# Patient Record
Sex: MALE | Race: WHITE
[De-identification: names, ages, dates, MRNs, and addresses within clinical notes are randomized per-mention and may not be internally consistent; named-entity substitution may affect disease eponyms.]

---

## 2017-06-16 ENCOUNTER — HOSPITAL ENCOUNTER (EMERGENCY)
Dept: HOSPITAL 62 - ER | Age: 27
Discharge: HOME | End: 2017-06-16
Payer: SELF-PAY

## 2017-06-16 VITALS — DIASTOLIC BLOOD PRESSURE: 64 MMHG | SYSTOLIC BLOOD PRESSURE: 101 MMHG

## 2017-06-16 DIAGNOSIS — M54.5: Primary | ICD-10-CM

## 2017-06-16 DIAGNOSIS — F17.200: ICD-10-CM

## 2017-06-16 PROCEDURE — 99283 EMERGENCY DEPT VISIT LOW MDM: CPT

## 2017-06-16 NOTE — ER DOCUMENT REPORT
HPI





- HPI


Patient complains to provider of: low back pain


Onset: This afternoon


Onset/Duration: Sudden


Quality of pain: Sharp


Pain Level: 5


Context: 


Patient states that a lawnmower fell in a ditch and he was attempting to lift 

it out.  Patient states that he started to ignore up he felt a pop in his lower 

back and had sudden pain.  Patient reports pain all across his lower back.  

Patient denies any radiculopathy or paresthesia.  Patient denies any previous 

history of low back pain.  Patient denies any urinary retention or incontinence.


Associated Symptoms: Other - Low back pain


Exacerbated by: Movement, Walking


Relieved by: Denies


Similar symptoms previously: No


Recently seen / treated by doctor: No





- ROS


ROS below otherwise negative: Yes


Systems Reviewed and Negative: Yes All other systems reviewed and negative





- CONSTITUTIONAL


Constitutional: DENIES: Fever





- NEURO


Neurology: DENIES: Weakness





- GASTROINTESTINAL


Gastrointestinal: DENIES: Nausea





- MUSCULOSKELETAL


Musculoskeletal: REPORTS: Back Pain.  DENIES: Extremity pain, Neck Pain





- DERM


Skin Color: Normal


Skin Problems: None





Past Medical History





- General


Information source: Patient





- Social History


Smoking Status: Current Every Day Smoker


Frequency of alcohol use: None


Drug Abuse: None


Occupation: 


Lives with: Family


Family History: None


Patient has suicidal ideation: No


Patient has homicidal ideation: No





- Medical History


Medical History: Negative


Neurological Medical History: Reports: Hx Migraine


Renal/ Medical History: Denies: Hx Peritoneal Dialysis


Past Surgical History: Reports: Hx Appendectomy





- Immunizations


Immunizations up to date: Yes


Hx Diphtheria, Pertussis, Tetanus Vaccination: Yes





Vertical Provider Document





- CONSTITUTIONAL


Agree With Documented VS: Yes


Exam Limitations: No Limitations


General Appearance: WD/WN, No Apparent Distress


Notes: 


PHYSICAL EXAMINATION:





GENERAL: Well-appearing, well-nourished and in no acute distress.





HEAD: Atraumatic, normocephalic.





EYES: sclera clear, anicteric, conjunctiva are normal.





ENT: nares patent, Moist mucous membranes.





NECK: Normal range of motion, supple no lymphadenopathy





LUNGS: respirations unlabored





HEART: Regular rate and rhythm without murmurs 





EXTREMITIES: Normal range of motion, no pitting or edema.  No cyanosis. Gait 

normal, pt ambulates without difficulty





BACK: lumbar paraspinal tenderness, no deformities or step-offs.  No CVA 

tenderness. 





NEUROLOGICAL: Cranial nerves grossly intact.  Normal speech, normal gait.  No 

saddle anesthesia. negative SLT bilat





PSYCH: Normal mood, normal affect.





SKIN: Warm, Dry, normal turgor, no rashes or lesions noted.











- INFECTION CONTROL


TRAVEL OUTSIDE OF THE [place] IN LAST 30 DAYS: No





- RESPIRATORY


O2 Sat by Pulse Oximetry: 97





Course





- Vital Signs


Vital signs: 


 











Temp Pulse Resp BP Pulse Ox


 


 98 F   81   18   109/58 L  97 


 


 06/16/17 16:23  06/16/17 16:23  06/16/17 16:23  06/16/17 16:23  06/16/17 16:23














Discharge





- Discharge


Clinical Impression: 


Low back pain


Qualifiers:


 Chronicity: acute Back pain laterality: bilateral Sciatica presence: without 

sciatica Qualified Code(s): M54.5 - Low back pain





Condition: Stable


Disposition: HOME, SELF-CARE


Instructions:  Low Back Pain ([facility]), Ice Packs ([facility]), Warm Packs ([facility]), Oral 

Narcotic Medication ([facility])


Additional Instructions: 


Return immediately for any new or worsening symptoms





Followup with your primary care provider, call tomorrow to make a followup 

appointment








Prescriptions: 


Cyclobenzaprine HCl [Flexeril 10 Mg Tablet] 10 mg PO TID #15 tablet


Oxycodone HCl/Acetaminophen [Percocet 5-325 mg Tablet] 1 - 2 tab PO ASDIR PRN #

15 tablet


 PRN Reason: 


Forms:  Return to Work


Referrals: 


[facility] [Provider Group] - Follow up as needed

## 2017-06-26 ENCOUNTER — HOSPITAL ENCOUNTER (EMERGENCY)
Dept: HOSPITAL 62 - ER | Age: 27
Discharge: LEFT BEFORE DISCHARGE | End: 2017-06-26
Payer: SELF-PAY

## 2017-06-26 DIAGNOSIS — Z53.21: Primary | ICD-10-CM

## 2017-07-02 ENCOUNTER — HOSPITAL ENCOUNTER (EMERGENCY)
Dept: HOSPITAL 62 - ER | Age: 27
LOS: 1 days | Discharge: LEFT BEFORE DISCHARGE | End: 2017-07-03
Payer: SELF-PAY

## 2017-07-02 DIAGNOSIS — Z53.21: Primary | ICD-10-CM

## 2017-07-03 VITALS — SYSTOLIC BLOOD PRESSURE: 132 MMHG | DIASTOLIC BLOOD PRESSURE: 84 MMHG

## 2017-07-14 ENCOUNTER — HOSPITAL ENCOUNTER (EMERGENCY)
Dept: HOSPITAL 62 - ER | Age: 27
Discharge: HOME | End: 2017-07-14
Payer: SELF-PAY

## 2017-07-14 VITALS — DIASTOLIC BLOOD PRESSURE: 74 MMHG | SYSTOLIC BLOOD PRESSURE: 126 MMHG

## 2017-07-14 DIAGNOSIS — M54.5: Primary | ICD-10-CM
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
## 2017-07-14 DIAGNOSIS — F17.210: ICD-10-CM

## 2017-07-14 PROCEDURE — 96372 THER/PROPH/DIAG INJ SC/IM: CPT

## 2017-07-14 PROCEDURE — 99283 EMERGENCY DEPT VISIT LOW MDM: CPT

## 2017-07-14 NOTE — ER DOCUMENT REPORT
ED Neck/Back Problem





- General


Chief Complaint: Back Injury


Stated Complaint: FALL/BACK PAIN


Time Seen by Provider: 07/14/17 11:29


Mode of Arrival: Ambulatory


Information source: Patient


Notes: 


26-year-old male presents to ED for low back pain.  He states he was moving a 

washer up the stairs and his leg gave out and fell and hit the back on the 

stairs.  Back felt like it popped when he was trying to lift.  Having a lot of 

pain in his back.  He has had frequent pain since 2013.  Has been seen here 

multiple times for back pain.


TRAVEL OUTSIDE OF THE [place] IN LAST 30 DAYS: No





- HPI


Patient complains to provider of: Lower back


Onset: Yesterday


Where: Home, Indoors


Onset: Gradual


Timing: Still present


Quality of pain: Sharp


Severity: Moderate


Pain Level: 4


Context: Lifting


Recent injury: Possibly


Associated symptoms: Like prior neck/back pain, Lower back pain.  denies: Motor 

loss, Numbness/tingling, Radiation to leg, Sensory loss, Unable to urinate


Relieved by: Nothing


Similar symptoms previously: Yes


Recently seen / treated by doctor: Yes





- Related Data


Allergies/Adverse Reactions: 


 





No Known Allergies Allergy (Verified 06/16/17 16:23)


 











Past Medical History





- General


Information source: Patient





- Social History


Smoking Status: Current Every Day Smoker


Cigarette use (# per day): Yes - Pack per day


Chew tobacco use (# tins/day): No


Smoking Education Provided: Yes - Less than 2 minutes


Frequency of alcohol use: Occasional


Drug Abuse: None


Occupation: Labor lawn maintenance


Lives with: Family


Family History: CAD, CVA, DM, Hyperlipidemia, Hypertension, Malignancy.  denies

: COPD, Thyroid Disfunction


Patient has suicidal ideation: No


Patient has homicidal ideation: No





- Past Medical History


Cardiac Medical History: Reports: None


Pulmonary Medical History: Reports: None


EENT Medical History: Reports: None


Neurological Medical History: Reports: Hx Migraine


Endocrine Medical History: Reports: None


Renal/ Medical History: Reports: None


Malignancy Medical History: Reports None


GI Medical History: Reports: None


Musculoskeltal Medical History: Reports Hx Arthritis, Reports Hx 

Musculoskeletal Deformity, Reports Hx Musculoskeletal Trauma


Skin Medical History: Reports None


Psychiatric Medical History: Reports: None


Traumatic Medical History: Reports: None


Infectious Medical History: Reports: None


Past Surgical History: Reports: Hx Appendectomy





- Immunizations


Immunizations up to date: Yes


Hx Diphtheria, Pertussis, Tetanus Vaccination: Yes





Review of Systems





- Review of Systems


Constitutional: No symptoms reported


EENT: No symptoms reported


Cardiovascular: No symptoms reported


Respiratory: No symptoms reported


Gastrointestinal: No symptoms reported.  denies: Abdominal pain, Diarrhea, 

Nausea, Constipation, Last bowel movement, Fecal incontinence


Genitourinary: No symptoms reported.  denies: Incontinence, Retention


Male Genitourinary: No symptoms reported


Musculoskeletal: Back pain, Muscle pain


Skin: No symptoms reported


Hematologic/Lymphatic: No symptoms reported


Neurological/Psychological: No symptoms reported


-: Yes All other systems reviewed and negative





Physical Exam





- Vital signs


Vitals: 


 











Temp Pulse Resp BP Pulse Ox


 


 98.1 F   86   16   126/74 H  96 


 


 07/14/17 11:19  07/14/17 11:19  07/14/17 11:19  07/14/17 11:19  07/14/17 11:19











Interpretation: Normal





- General


General appearance: Appears well, Alert





- HEENT


Head: Normocephalic, Atraumatic


Eyes: Normal


Pupils: PERRL





- Respiratory


Respiratory status: No respiratory distress


Chest status: Nontender


Breath sounds: Normal


Chest palpation: Normal





- Cardiovascular


Rhythm: Regular


Heart sounds: Normal auscultation


Murmur: No





- Abdominal


Inspection: Normal


Distension: No distension


Bowel sounds: Normal


Tenderness: Nontender


Organomegaly: No organomegaly





- Back


Back: Normal, Tender.  No: Deformity/step-off, CVA tenderness, Vertebra 

tenderness, Scars, Scoliosis, Wounds





- Extremities


General upper extremity: Normal inspection, Nontender, Normal color, Normal ROM

, Normal temperature


General lower extremity: Normal inspection, Nontender, Normal color, Normal ROM

, Normal temperature, Normal weight bearing.  No: Ashleigh's sign





- Neurological


Neuro grossly intact: Yes


Cognition: Normal


Orientation: AAOx4


[place] Coma Scale Eye Opening: Spontaneous


[place] Coma Scale Verbal: Oriented


[place] Coma Scale Motor: Obeys Commands


[place]ack Coma Scale Total: 15


Speech: Normal


Motor strength normal: LUE, RUE, LLE, RLE


Sensory: Normal





- Psychological


Associated symptoms: Normal affect, Normal mood





- Skin


Skin Temperature: Warm


Skin Moisture: Dry


Skin Color: Normal





Course





- Re-evaluation


Re-evalutation: 





07/14/17 12:09


Signs or symptoms of cauda equina, no loss of sensation, no loss of bowel or 

bladder control, no loss of muscle control, and no saddle anesthesia.  Has had 

chronic back pain in this area since 2011.  Today his pain was activated by 

lifting a washing machine and his feet come out from under him.  Treated with 

Toradol and Flexeril and discharged home with prescription for Flexeril.





- Vital Signs


Vital signs: 


 











Temp Pulse Resp BP Pulse Ox


 


 98.1 F   86   16   126/74 H  96 


 


 07/14/17 11:19  07/14/17 11:19  07/14/17 11:19  07/14/17 11:19  07/14/17 11:19














Discharge





- Discharge


Clinical Impression: 


Low back pain


Qualifiers:


 Chronicity: chronic Back pain laterality: bilateral Sciatica presence: without 

sciatica Qualified Code(s): M54.5 - Low back pain





Condition: Stable


Disposition: HOME, SELF-CARE


Instructions:  Family Physicians / Practices, Stretching Exercises for the Back 

([facility])


Additional Instructions: 


LOW BACK PAIN:





     Three out of every four people will have an episode of disabling back pain 

during their lifetime.  Most commonly the pain is due to straining of the 

muscles and ligaments in the low back.


     Usual treatment includes: 


(1) Rest on a firm surface.  Avoid lying on your stomach.  


(2) Ice pack the painful area.  After a few days, gentle heat may be used 

intermittently to relax the area, or ice packs can be continued.  


(3) Medication may be needed -- muscle relaxers and antiinflammatory medicines 

are commonly used.  


(4) As the back improves, exercises are prescribed to strengthen the back and 

abdominal muscles.


     Your doctor will advise you on the proper care for your back at each stage 

in your recovery.  You may be better in a few days -- or healing may take 

several weeks.


     If new symptoms of a "herniated disc" (radiation of pain, numbness, or 

tingling down the back of the leg or weakness in the leg) occur, you should be 

re-examined.  Further testing may be necessary.





Chronic Back Pain





     Chronic back pain (pain persisting longer than three months) is a common 

problem. A medical evaluation can look for herniated disc, arthritis, 

osteoporosis, tumors, and infections. But at least half the time, there's no 

obvious treatable cause. Anxiety and depression tend to worsen back pain.


     Ibuprofen or other anti-inflammatory medicine can help. A heating pad, 

used for 15-20 minutes at a time, can ease pain. For this type of back pain, 

narcotic medicines should be avoided. Muscle relaxers are rarely helpful unless 

you're having spasms.


     Activity is important. Find an aerobic exercise program that your back can 

tolerate. Too much rest makes back pain worse. Specific back exercises are 

usually prescribed to strengthen the back and abdominal muscles. Often, a 

physical therapist can help. Avoid heavy lifting, working while bent over, or 

standing with both knees straight.


     Most back pain patients do better with a firm mattress.


     If new symptoms of a "herniated disc" (radiation of pain, numbness, or 

tingling down the back of the leg or weakness in the leg) occur, you should be 

re-examined.





Chronic Pain Control





     Stress, inactivity, and depression make pain more severe regardless of the 

cause of the pain.  Stress and poor physical condition can cause pain such as 

headaches and backache.


     Relaxation:  Rest in a quiet place with your eyes closed for 20 minutes 

twice daily.  Concentrate on a pleasant image, or simply "feel" your breathing.

  Clear your mind.


     Stress management:  Deal with your "stressors."  Either take action, or 

eliminate the stressor from your life.  Don't let things hang over you.  Accept 

those things you can't change.


     Nutrition:  Eat small, balanced meals -- don't skip, don't overeat.  Meals 

should be high-carbohydrate, low-sugar, low-fat.


     Exercise:  Exercise helps painful conditions and eases stress. Get 30 

minutes of moderate exercise, five days a week. Do an activity that does not 

flare your pain.


     Precautions:  Pain which continues to disrupt daily activities, or which 

changes in nature, requires a medical evaluation.  Pain Clinic referral is 

available.





     


We do not manage chronic pain in the Emergency Department.  We will try to 

appropriately help you through an acute flare of your chronic painful condition

, but for on-going chronic pain that does not improve, you will need to see 

your private doctor or pain management specialist.  We do not provide repeated 

medication management of chronic painful conditions.  If you wish, we can 

provide the name of local pain management physicians.





MUSCLE RELAXERS: 


     Muscle relaxing medications are usually prescribed for acute muscle spasm 

or injury to the neck and back.  They are often combined with antiinflammatory 

pain medication for increased relief.


     You may stop the muscle relaxer when the pain and stiffness have improved.

  Start the medication again if spasms recur.  


     Muscle relaxers may cause drowsiness, especially with the first dose.  Do 

not operate machinery or drive while under the effects of the medication.  Most 

muscle relaxers last up to 24 hours.  Do not combine the medication with 

alcohol.








ICE PACKS:


     Apply ice packs frequently against the painful area.  Many different 

schedules are recommended, such as "20 minutes on, 20 minutes off" or "one hour 

ice, two hours rest."  If you need to work, you may need to go longer between 

ice treatments.  You should plan to have the area ice packed AT LEAST one 

fourth of the time.


     The ice should be applied over the wrap, tape, or splint, or over a layer 

of cloth -- not directly against the skin.  Some ice bags have a built-in cloth 

and can be put directly on the skin.





WARM PACKS:


     After approximately two days, apply gentle heat (such as a heating pad or 

hot water bottle) for about 20 to 30 minutes about every two hours -- at least 

four times daily.  Warmth and elevation will help you make a more rapid recovery

, and will ease the pain considerably.


     Do not use HOT heat, and never apply heat for longer than 30 minutes.  The 

continuous heat can invisibly damage skin and muscles -- even when no burn is 

seen on the surface.  Damaged muscles can make you MORE sore.








FOLLOW-UP CARE:


If you have been referred to a physician for follow-up care, call the physician

s office for an appointment as you were instructed or within the next two days.

  If you experience worsening or a significant change in your symptoms, notify 

the physician immediately or return to the Emergency Department at any time for 

re-evaluation.


Prescriptions: 


Cyclobenzaprine HCl [Flexeril 10 mg Tablet] 10 mg PO TIDP PRN #15 tab


 PRN Reason: 


Forms:  Elevated Blood Pressure, Smoking Cessation Education, Return to Work


Referrals: 


MARIO BORREGO MD [ACTIVE STAFF] - Follow up as needed

## 2019-04-05 ENCOUNTER — HOSPITAL ENCOUNTER (EMERGENCY)
Dept: HOSPITAL 62 - ER | Age: 29
Discharge: HOME | End: 2019-04-05
Payer: SELF-PAY

## 2019-04-05 VITALS — SYSTOLIC BLOOD PRESSURE: 142 MMHG | DIASTOLIC BLOOD PRESSURE: 89 MMHG

## 2019-04-05 DIAGNOSIS — S53.401A: Primary | ICD-10-CM

## 2019-04-05 DIAGNOSIS — G89.29: ICD-10-CM

## 2019-04-05 DIAGNOSIS — R11.2: ICD-10-CM

## 2019-04-05 DIAGNOSIS — F17.200: ICD-10-CM

## 2019-04-05 DIAGNOSIS — M25.521: ICD-10-CM

## 2019-04-05 DIAGNOSIS — M25.562: ICD-10-CM
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
## 2019-04-05 DIAGNOSIS — M25.462: ICD-10-CM
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
## 2019-04-05 PROCEDURE — 73564 X-RAY EXAM KNEE 4 OR MORE: CPT

## 2019-04-05 PROCEDURE — S0119 ONDANSETRON 4 MG: HCPCS

## 2019-04-05 PROCEDURE — 73080 X-RAY EXAM OF ELBOW: CPT

## 2019-04-05 PROCEDURE — 99283 EMERGENCY DEPT VISIT LOW MDM: CPT

## 2019-04-05 NOTE — RADIOLOGY REPORT (SQ)
EXAM DESCRIPTION:  ELBOW RIGHT OVER 2 VIEWS



COMPLETED DATE/TIME:  4/5/2019 9:43 am



REASON FOR STUDY:  struck elbow on wall



COMPARISON:  None.



NUMBER OF VIEWS:  Four views.



TECHNIQUE:  AP, lateral, and both oblique radiographic images acquired of the right elbow.



LIMITATIONS:  None.



FINDINGS:  MINERALIZATION: Normal.

BONES: No acute fracture or dislocation.  No worrisome bone lesions.

JOINT: No effusion.

SOFT TISSUES: No soft tissue swelling.  No foreign body.

OTHER: No other significant finding.



IMPRESSION:  No fracture or dislocation of the right elbow.  Joint spaces are well preserved.  No elb
ow joint effusion to suggest radiographically occult fracture.



TECHNICAL DOCUMENTATION:  JOB ID:  [id number]

 2011 [employer]- All Rights Reserved



Reading location - IP/workstation name: [email or web address]

## 2019-04-05 NOTE — ER DOCUMENT REPORT
HPI





- HPI


Patient complains to provider of: Right elbow, left knee pain


Time Seen by Provider: 04/05/19 09:19


Onset/Duration: Persistent


Quality of pain: Achy


Pain Level: 3


Context: 





Patient states that he has had chronic knee pain off and on since he was a 

child.  Patient states typically the pain only lasted for a few days and then 

resolves.  Patient presently complains of left knee pain for the past 3 weeks 

that has been persistent.  Patient denies any new knee injury.  Patient states 

that he was moving furniture yesterday and accidentally struck his right elbow 

against a wall.  Patient complains of right elbow pain with movement.  Patient 

states he woke up today and the pain made him feel nauseous and he vomited 

twice.  Patient without any abdominal tenderness or fever.


Associated Symptoms: Nausea, Vomiting, Other - Right elbow, left knee pain.  

denies: Nonproductive cough, Fever


Exacerbated by: Movement


Relieved by: Remaining still


Similar symptoms previously: Yes - Left knee pain


Recently seen / treated by doctor: No





- ROS


ROS below otherwise negative: Yes


Systems Reviewed and Negative: Yes All other systems reviewed and negative





- CONSTITUTIONAL


Constitutional: DENIES: Fever, Chills





- NEURO


Neurology: DENIES: Weakness





- GASTROINTESTINAL


Gastrointestinal: REPORTS: Nausea, Patient vomiting.  DENIES: Abdominal Pain, 

Diarrhea





- URINARY


Urinary: DENIES: Dysuria





- MUSCULOSKELETAL


Musculoskeletal: REPORTS: Extremity pain - Right elbow, left knee, Swelling - 

Left knee





- DERM


Skin Color: Normal


Skin Problems: None





Past Medical History





- General


Information source: Patient





- Social History


Smoking Status: Current Every Day Smoker


Smoking Education Provided: Yes


Frequency of alcohol use: Occasional


Drug Abuse: None


Occupation: lawn service


Family History: CAD, CVA, DM, Hyperlipidemia, Hypertension, Malignancy.  denies:

COPD, Thyroid Disfunction


Neurological Medical History: Reports: Hx Migraine


Renal/ Medical History: Denies: Hx Peritoneal Dialysis


Musculoskeletal Medical History: Reports Hx Arthritis, Reports Hx 

Musculoskeletal Deformity, Reports Hx Musculoskeletal Trauma


Past Surgical History: Reports: Hx Appendectomy





- Immunizations


Immunizations up to date: Yes


Hx Diphtheria, Pertussis, Tetanus Vaccination: Yes





Vertical Provider Document





- CONSTITUTIONAL


Agree With Documented VS: Yes


Exam Limitations: No Limitations


General Appearance: WD/WN, No Apparent Distress





- INFECTION CONTROL


TRAVEL OUTSIDE OF THE [place] IN LAST 30 DAYS: No





- HEENT


HEENT: Atraumatic, Normocephalic





- NECK


Neck: Normal Inspection





- RESPIRATORY


Respiratory: Breath Sounds Normal, No Respiratory Distress





- CARDIOVASCULAR


Cardiovascular: Regular Rate, Regular Rhythm


Pulses: Normal: Radial, Posterior tibial





- MUSCULOSKELETAL/EXTREMETIES


Musculoskeletal/Extremeties: MAEW, FROM, Tender - Tenderness over olecranon 

process of right elbow.  No objective swelling.  Normal skin color and 

temperature overlying right elbow.  Patient with full range of motion.  No def

ormity.  Left knee joint tenderness to inferior compartment with mild joint 

effusion.  Normal skin color and temperature overlying joint.  Patient with full

range of motion.  Patellar tendon intact.  No laxity with varus or valgus 

maneuvers., Edema - Left knee





- NEURO


Level of Consciousness: Awake, Alert, Appropriate


Motor/Sensory: No Motor Deficit





- DERM


Integumentary: Warm, Dry, No Rash





Course





- Re-evaluation


Re-evalutation: 





04/05/19 10:16


X-rays reviewed, no concern for acute fracture or dislocation.  No concern for 

septic arthritis or gout at this time.  Offered patient crutches, patient 

declined.  Patient encouraged to follow-up with orthopedics for any persistent 

pain or problems.


04/05/19 10:26


Patient preferred to apply Ace wrap to knee once he was home.  Patient 

instructed on correct use per PCT.





- Vital Signs


Vital signs: 


                                        











Temp Pulse Resp BP Pulse Ox


 


 97.7 F   92   18   142/89 H  96 


 


 04/05/19 09:13  04/05/19 09:13  04/05/19 09:13  04/05/19 09:13  04/05/19 09:13














- Diagnostic Test


Radiology reviewed: Image reviewed, Reports reviewed





Procedures





- Immobilization


  ** Right Elbow


Pre-Proc Neuro Vasc Exam: Normal


Immobilizer type: Sling


Performed by: PCT


Post-Proc Neuro Vasc Exam: Normal


Alignment checked and good: Yes





Discharge





- Discharge


Clinical Impression: 


Sprain of right elbow


Qualifiers:


 Encounter type: initial encounter Qualified Code(s): S53.401A - Unspecified 

sprain of right elbow, initial encounter





Chronic knee pain


Qualifiers:


 Laterality: left Qualified Code(s): M25.562 - Pain in left knee





Condition: Stable


Disposition: HOME, SELF-CARE


Instructions:  Arthritis ([facility]), Ice & Elevation ([facility]), Suspected Internal Knee 

Injury ([facility]), Sprain ([facility]), Temporary Sling ([facility])


Additional Instructions: 


Return immediately for any new or worsening symptoms





Followup with your primary care provider, call tomorrow to make a followup ap

pointment





Wear sling for 4-5 days and then remove.  If still having pain follow-up with 

orthopedics for further evaluation.  Only wear sling while awake.


Prescriptions: 


Naproxen [Naprosyn 250 Nmg Tablet] 1 tab PO BID #14 tablet


Ondansetron HCl [Zofran 4 mg Tablet] 1 - 2 tab PO Q6 PRN #15 tablet


 PRN Reason: 


Forms:  Smoking Cessation Education, Return to Work


Referrals: 


[facility] ([facility] [Provider Group] - Follow up as needed

## 2019-04-05 NOTE — RADIOLOGY REPORT (SQ)
EXAM DESCRIPTION:  KNEE LEFT 4 VIEW



COMPLETED DATE/TIME:  4/5/2019 9:43 am



REASON FOR STUDY:  L knee pain



COMPARISON:  None.



NUMBER OF VIEWS:  Four views.



TECHNIQUE:  AP, lateral, and both oblique radiographic images acquired of the left knee.



LIMITATIONS:  None.



FINDINGS:  MINERALIZATION: Normal.

BONES: No acute fracture or dislocation.  No worrisome bone lesions.

JOINT: Moderate knee joint effusion.  Joint spaces are well preserved.

SOFT TISSUES: No soft tissue swelling.  No radio-opaque foreign body.

OTHER: No other significant finding.



IMPRESSION:  No fracture or dislocation of the left knee.  There is a moderate nonspecific knee joint
 effusion.  Consider MRI to further evaluate for internal derangement.



TECHNICAL DOCUMENTATION:  JOB ID:  [id number]

 2011 [employer]- All Rights Reserved



Reading location - IP/workstation name: [email or web address]

## 2019-04-09 ENCOUNTER — HOSPITAL ENCOUNTER (EMERGENCY)
Dept: HOSPITAL 62 - ER | Age: 29
Discharge: HOME | End: 2019-04-09
Payer: SELF-PAY

## 2019-04-09 VITALS — DIASTOLIC BLOOD PRESSURE: 89 MMHG | SYSTOLIC BLOOD PRESSURE: 117 MMHG
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
## 2019-04-09 DIAGNOSIS — S53.401A: Primary | ICD-10-CM

## 2019-04-09 DIAGNOSIS — F17.210: ICD-10-CM

## 2019-04-09 PROCEDURE — 99283 EMERGENCY DEPT VISIT LOW MDM: CPT

## 2019-04-09 NOTE — ER DOCUMENT REPORT
ED Extremity Problem, Upper





- General


Chief Complaint: Elbow Injury


Stated Complaint: ELBOW PAIN


Time Seen by Provider: 04/09/19 14:25


Primary Care Provider: 


[facility] [Provider Group] - Follow up as needed


Mode of Arrival: Ambulatory


Information source: Patient


Notes: 





28-year-old male presents to ED for complaint of right elbow pain since he was 

seen last week and diagnosed with a sprain elbow.  He states he stayed out of 

work and has been taking the naproxen as instructed.  He states he went back to 

work today and the pain was so bad he had to go home.  He states he is not able 

to lift his shoulder all the way up or to bend his elbow back and forth as 

instructed.  He states he did not follow-up with the orthopedics as instructed.


TRAVEL OUTSIDE OF THE [place] IN LAST 30 DAYS: No





- HPI


Patient complains to provider of: Injury, Pain, Right, Elbow


Recent injury: Yes - 4/5/2019 diagnosed with sprained both


Where: Work


Quality of pain: Pressure, Sharp


Severity of pain: Moderate


Pain Level: 4


Context: Blow


Associated symptoms: None


Exacerbated by: Movement, Exertion


Relieved by: Rest, Positioning


Similar symptoms previously: Yes


Recently seen / treated by doctor: Yes





- Related Data


Allergies/Adverse Reactions: 


                                        





No Known Allergies Allergy (Verified 04/09/19 13:47)


   











Past Medical History





- General


Information source: Patient





- Social History


Smoking Status: Current Every Day Smoker


Cigarette use (# per day): Yes


Smoking Education Provided: Yes - Minutes


Frequency of alcohol use: Occasional


Drug Abuse: None


Occupation: Lawn service


Family History: CAD, CVA, DM, Hyperlipidemia, Hypertension, Malignancy.  denies:

COPD, Thyroid Disfunction


Patient has suicidal ideation: No


Patient has homicidal ideation: No





- Past Medical History


Cardiac Medical History: Reports: None


Pulmonary Medical History: Reports: None


EENT Medical History: Reports: None


Neurological Medical History: Reports: Hx Migraine


Endocrine Medical History: Reports: None


Renal/ Medical History: Reports: None


Malignancy Medical History: Reports None


GI Medical History: Reports: None


Musculoskeletal Medical History: Reports Hx Arthritis, Reports Hx 

Musculoskeletal Deformity, Reports Hx Musculoskeletal Trauma


Skin Medical History: Reports None


Psychiatric Medical History: Reports: None


Traumatic Medical History: Reports: None


Infectious Medical History: Reports: None


Past Surgical History: Reports: Hx Appendectomy





- Immunizations


Immunizations up to date: Yes


Hx Diphtheria, Pertussis, Tetanus Vaccination: Yes





Review of Systems





- Review of Systems


Constitutional: No symptoms reported


EENT: No symptoms reported


Cardiovascular: No symptoms reported


Respiratory: No symptoms reported


Gastrointestinal: No symptoms reported


Genitourinary: No symptoms reported


Male Genitourinary: No symptoms reported


Musculoskeletal: Joint pain - Left elbow


Skin: No symptoms reported


Hematologic/Lymphatic: No symptoms reported


Neurological/Psychological: No symptoms reported


-: Yes All other systems reviewed and negative





Physical Exam





- Vital signs


Vitals: 


                                        











Temp Pulse Resp BP Pulse Ox


 


 97.5 F   76   16   117/89 H  99 


 


 04/09/19 13:59  04/09/19 13:59  04/09/19 13:59  04/09/19 13:59  04/09/19 13:59











Interpretation: Normal





- General


General appearance: Appears well, Alert





- HEENT


Head: Normocephalic, Atraumatic


Eyes: Normal


Pupils: PERRL





- Respiratory


Respiratory status: No respiratory distress


Chest status: Nontender


Breath sounds: Normal


Chest palpation: Normal





- Cardiovascular


Rhythm: Regular


Heart sounds: Normal auscultation


Murmur: No





- Abdominal


Inspection: Normal


Distension: No distension


Bowel sounds: Normal


Tenderness: Nontender


Organomegaly: No organomegaly





- Back


Back: Normal, Nontender





- Extremities


General upper extremity: Normal inspection, Normal color, Normal temperature


General lower extremity: Normal inspection, Nontender, Normal color, Normal ROM,

Normal temperature, Normal weight bearing.  No: Ashleigh's sign


Elbow: Tender, Limited ROM - Patient states he cannot fully extend or flex the 

elbow due to pain.  No: Abrasion, Deformity, Dislocation, Ecchymosis, 

Instability, Joint effusion, Laceration, Swollen bursa





- Neurological


Neuro grossly intact: Yes


Cognition: Normal


Orientation: AAOx4


[place] Coma Scale Eye Opening: Spontaneous


[place] Coma Scale Verbal: Oriented


[place] Coma Scale Motor: Obeys Commands


[place] Coma Scale Total: 15


Speech: Normal


Motor strength normal: LUE, RUE, LLE, RLE


Sensory: Normal





- Psychological


Associated symptoms: Normal affect, Normal mood





- Skin


Skin Temperature: Warm


Skin Moisture: Dry


Skin Color: Normal





Course





- Re-evaluation


Re-evalutation: 





04/09/19 21:36


Patient was given instructions on exercises for the elbow as well as elevation 

ice ibuprofen.  Patient was given 1 narcotic in the emergency room and 

instructed to follow-up with orthopedics as he was previously instructed.  

Patient was discharged home after he verbalized understanding and agreement with

treatment plan.  His wife did come to pick him up.





- Vital Signs


Vital signs: 


                                        











Temp Pulse Resp BP Pulse Ox


 


 97.5 F   76   16   117/89 H  99 


 


 04/09/19 13:59  04/09/19 13:59  04/09/19 13:59  04/09/19 13:59  04/09/19 13:59














Discharge





- Discharge


Clinical Impression: 


Sprain of right elbow


Qualifiers:


 Encounter type: subsequent encounter Qualified Code(s): S53.401D - Unspecified 

sprain of right elbow, subsequent encounter





Condition: Stable


Disposition: HOME, SELF-CARE


Instructions:  Forearm Exercise Program ([facility]), Range of Motion Exercises ([facility])


Additional Instructions: 


SPRAIN:





     Your injury is a sprain.  A sprain results from stretching or tearing of 

the ligaments, usually from a twisting injury.  The ligaments will require time 

and protection in order to heal properly. Many sprains are quite disabling and 

should be taken seriously.


     The usual initial treatment of sprains is cold packs, elevation, and rest 

of the injured area.  Your physician has assessed the seriousness of your 

ligament injury, and has outlined a treatment plan. Understand that this 

treatment may change, depending on how you progress.


     If a re-examination was recommended, it is important that you follow up as 

instructed.  Call the doctor any time if there is severe pain, numbness, or loss

of function in the injured area.





ICE & ELEVATION:


     Apply ice packs frequently against the painful area.  Many different 

schedules are recommended, such as "20 minutes on, 20 minutes off" or "one hour 

ice, two hours rest."  If you need to work, you may need to go longer between 

ice treatments.  You should plan to have the area ice packed AT LEAST one-fourth

of the time.


     The ice should be applied over the wrap, tape, or splint, or over a layer 

of cloth -- not directly against the skin.  Some ice bags have a built-in cloth 

and can be put directly on the skin.


     Your injured part should be elevated as much as possible over the next 48 

hours.  Try to keep the injury above the level of the heart. Avoid use of the 

injured area.  Elevation and rest will decrease the swelling.








USE OF OVER-THE-COUNTER IBUPROFEN:


     Ibuprofen (Advil, Nuprin, Medipren, Motrin IB) is a medication for fever 

and pain control.  In addition, it has anti- inflammatory effects which may be 

beneficial, especially in the treatment of injuries.


     It's best to take ibuprofen with food.  Persons with ulcer disease or 

allergy to aspirin should notify their physician of this before taking 

ibuprofen.


     Ibuprofen can be given every four to six hours, for a total of four doses 

daily.


     Age              Pain or fever dose          Antiinflammatory dose


     6-8 yr              200 mg (1 tab)                200 mg (1 tab)


     9-11 yr             200 mg (1 tab)                200-400 mg (1-2 tab)


     11-14 yr            200-400 mg (1-2 tab)         400 mg (2 tab)


     15-adult            400 mg (2 tab)                600 mg (3 tab)








ORAL NARCOTIC MEDICATION:


     You have been given a norco for pain control.  This medication is a 

narcotic.  It's best taken with food, as nausea can result if taken on an empty 

stomach.


     Don't operate machinery or drive within six hours of taking this 

medication.  Do not combine this medicine with alcohol, or with any medication 

which can cause sedation (such as cold tablets or sleeping pills) unless you get

permission from the physician.


     Narcotics tend to cause constipation.  If possible, drink plenty of fluids 

and eat a diet high in fiber and fruits.





     Please be aware that prescription narcotics also have the potential for 

abuse.  People become addicted to these medications because of the general sense

of wellbeing that they induce.  This feeling along with a significant reduction 

in tension, anxiety, and aggression provides a stimulating seductive quality to 

these drugs.  Once your pain is under control, we encourage you to discard your 

unused narcotics.





Take your naproxen as it was ordered or use over-the-counter ibuprofen.  Please 

use your sling until you can follow-up with orthopedics.  Please do exercises 

for your elbow as I have described to you.





Exercise Program for the Shoulder





     Since the shoulder moves in so many directions, the joint attachment is 

weak.  Muscles provide most of the stability to the shoulder.  You must exercise

your shoulder to prevent painful instability or stiffening.


     PASSIVE - These may be begun within a few days of the injury. While 

standing, lean forward, allowing the arm to hang down towards the floor.  Move 

the arm in small circles while slowly twisting your chest towards and away from 

the hanging arm.  Do this for one minute.


     ACTIVE - These may be performed when the doctor gives permission. Begin 

with the arms at the sides.  Raise the arms forward (shoulder's width apart) 

until they reach shoulder level.  Then slowly swing both arms back until they 

are aiming straight out away from each other. Then bring them forward again, and

finally, lower them to your sides. Repeat 20 to 30 times.  As you improve, put 

weights in your hands for the exercise.  Start with one pound, and work up to 10

pounds.  Never use more than is comfortable.  Athletes may work up to 30 pounds.








FOLLOW-UP CARE:


If you have been referred to a physician for follow-up care, call the 

physicians office for an appointment as you were instructed or within the next 

two days.  If you experience worsening or a significant change in your symptoms,

notify the physician immediately or return to the Emergency Department at any 

time for re-evaluation.


Forms:  Elevated Blood Pressure, Smoking Cessation Education, Return to Work


Referrals: 


[facility] [Provider Group] - Follow up as needed